# Patient Record
Sex: MALE | Race: WHITE
[De-identification: names, ages, dates, MRNs, and addresses within clinical notes are randomized per-mention and may not be internally consistent; named-entity substitution may affect disease eponyms.]

---

## 2019-08-01 ENCOUNTER — HOSPITAL ENCOUNTER (OUTPATIENT)
Dept: HOSPITAL 61 - PNCL | Age: 84
Discharge: HOME | End: 2019-08-01
Attending: ANESTHESIOLOGY
Payer: MEDICARE

## 2019-08-01 DIAGNOSIS — Z79.82: ICD-10-CM

## 2019-08-01 DIAGNOSIS — M51.16: ICD-10-CM

## 2019-08-01 DIAGNOSIS — Z79.84: ICD-10-CM

## 2019-08-01 DIAGNOSIS — I10: ICD-10-CM

## 2019-08-01 DIAGNOSIS — K21.9: ICD-10-CM

## 2019-08-01 DIAGNOSIS — M96.1: ICD-10-CM

## 2019-08-01 DIAGNOSIS — H91.90: ICD-10-CM

## 2019-08-01 DIAGNOSIS — Z85.79: ICD-10-CM

## 2019-08-01 DIAGNOSIS — M48.061: Primary | ICD-10-CM

## 2019-08-01 DIAGNOSIS — Z79.899: ICD-10-CM

## 2019-08-01 DIAGNOSIS — M19.90: ICD-10-CM

## 2019-08-01 DIAGNOSIS — Z90.89: ICD-10-CM

## 2019-08-01 PROCEDURE — G0463 HOSPITAL OUTPT CLINIC VISIT: HCPCS

## 2019-08-02 NOTE — PAIN
DATE OF SERVICE:  08/01/2019



INITIAL CONSULTATION FOR PAIN CLINIC



DIAGNOSES:  Lumbar radiculopathy, lumbar degenerative disk disease, lumbar

spinal stenosis and post-lumbar laminectomy syndrome.



CHIEF COMPLAINT:  Low back and bilateral lower extremity pain.



HISTORY OF PRESENT ILLNESS:  This is an 83-year-old male with low back pain,

bilateral lower extremity pain going on for many years, worse over the past 6

months or so.  The patient reports it has increased with walking, standing,

changing positions and traveling.  He stopped driving because he is having some

dizziness associated with the pain.  The patient reports it does not awaken him

from sleep at night, though he feels much better with sitting or lying down.  It

does not affect his bowel or bladder control, but does affect his ability to

walk.  He is not using any assistive devices, however, to ambulate.  The patient

did have an MRI scan of the lumbar spine, which is showing previous surgery at

the L3-L4 and L4-L5 levels with disk bulge at L3-L4 and L4-L5, broad-based disk

bulge, with bilateral facet arthropathy at both levels, ligamentum flavum

thickening with marked central spinal stenosis at L3-L4 and marked right and at

least moderate left neural foraminal stenosis at L3-L4 and L4-L5, shows minimal

lateral left recess stenosis and marked right and minimal left neural foraminal

stenosis at L5-S1, shows degenerative disk disease with disk desiccation,

broad-based disk bulge as well as mild left neural foraminal stenosis.  The

patient rates his disability from 0-10, 10 being the worst, as a 2-10 with

family home responsibilities, social activity and occupation, 2 with self-care

and 2 with life support activities.  The patient has not had any formal physical

therapies at this time, no recent other treatments or chiropractic treatment. 

He is just doing some stretching and strengthening on his own, but no formal

therapies or treatments at this time.  The patient did see his neurosurgeon, who

did his first surgery, which was in 2005, and is recommending conservative

measures at this time.  The patient reports significant fatigability to lower

extremities, essentially equal right and left lower extremities with pain

involved as well as low back and hips.  The patient reports pain is intermittent

in intensity, changes during the day, worse with activity, with tingling and

numbness in the low back radiating to both the lower extremities with a shooting

sensation as well.



PAST MEDICAL HISTORY:  Significant for hearing loss; lymphoma, chemotherapy in

2015 to 2018; shortness of breath; pneumonia; hypertension; difficulty

urinating; gastroesophageal reflux; dizziness; arthritis.



PREVIOUS SURGERIES:  Include tonsillectomy, hernia repair, right shoulder

surgery, left total knee arthroplasty, right total knee arthroplasty and lumbar

diskectomy at L3-L4 and L4-L5 in 2005.



CURRENT MEDICATIONS:  Include daily baby aspirin, Plavix, finasteride, B

enzymes, gabapentin, glucosamine, magnesium, ____ and levothyroxine.



ALLERGIES:  The patient has no known drug allergies.



FAMILY HISTORY:  Significant for cancers of various types.



SOCIAL HISTORY:  The patient does not drink alcohol, does not smoke.  He is not

using illegal or recreational drugs.  He is , lives with his spouse,

works as a farmer, lives in [place], [place].



REVIEW OF SYSTEMS:  The patient's review of systems is positive for those items

mentioned in the history of present illness.  All systems reviewed otherwise

negative.  It is complete, full and well documented on the patient's chart.



PHYSICAL EXAMINATION:

VITAL SIGNS:  The patient's blood pressure is 130/82, pulse is 62, respirations

16, temperature 98.3 degrees Fahrenheit, height is 6 feet, weight is 200 pounds.

GENERAL:  The patient is awake, alert, oriented, appropriate, very pleasant

demeanor.

HEENT:  Head shows normocephalic, atraumatic.  Extraocular movements are intact

and symmetrical.  Oral cavity:  Mucous membranes are moist and pink; dentition

is intact.

NECK:  Shows anterior throat supple without palpable lymphadenopathy noted. 

Swallow reflex is symmetrical.

CHEST:  Shows normal on inspection.  Breath sounds are clear to auscultation

bilaterally.

HEART:  Shows S1, S2 clear.  No murmurs auscultated.

ABDOMEN:  Soft, nontender and nondistended.  No palpable organomegaly is noted. 

No rebound or guarding demonstrated.

BACK:  Shows spine grossly in the midline.  Normal-appearing thoracic kyphosis

and some minor flattening of lumbar lordotic curvature with well-healed surgical

scar noted in the lumbar distribution.  Lumbar paraspinous musculature shows

symmetrical on inspection; on palpation shows some moderate tenderness

diffusely, but only diffusely without radiation.  The patient shows good

rotational motion of lumbar spine, both laterally as well as extension and

flexion, without significant increase in pain.  No tenderness over the spinous

process, sacrum or sacroiliac regions.

EXTREMITIES:  The patient's lower extremities show deep tendon reflexes are 1+

in the patellar and tendo-calcaneus tendons.  Motor exam is strong with 5/5

dorsiflexion, extension and equal.  Peripheral pulses are 1+ posterior tibial. 

No peripheral edema is noted.  Straight leg raise is noted to be negative

bilaterally.  Gaenslen's and Lyle maneuvers are negative bilaterally as well.

 Lower extremities are warm and dry to touch, equal in color and appearance. 

The patient is able to stand on his toes without significant difficulty or loss

of balance, walks with a slight favoring gait, does appear to favor the right

lower extremity compared to the left, does not use any assistive device to

ambulate.

SKIN:  Shows warm and dry, good turgor.  No edema.  No sores, rashes or bruises.



IMPRESSION:

1.  This is an 83-year-old male with a long history low back and bilateral lower

extremity pain in a radicular fashion.

2.  MRI scan of lumbar spine as noted.

3.  Hypertension.

4.  History of lymphoma.

5.  Arthritis.



PLAN:  Options were discussed with the patient including conservative medical

management, physical therapy and interventional techniques and he would like to

pursue with interventional techniques.  We discussed a lumbar epidural steroid

injection using descriptions as well as anatomical models to describe the

procedure.  We will first wait for clearance with his prescribing physician for

his Plavix to be held for 7 days prior to injection.  The patient in the

meantime will maintain with stretching and strengthening exercises as tolerated.

 We will have him return if it is deemed safe and appropriate to hold his Plavix

and we will plan on lumbar epidural steroid injection on return.

 



______________________________

MAAME LYNN MD



DR:  [email or web address]  JOB#:  [id number] / [id number]

DD:  08/01/2019 12:53  DT:  08/02/2019 01:09

## 2019-08-15 ENCOUNTER — HOSPITAL ENCOUNTER (OUTPATIENT)
Dept: HOSPITAL 61 - PNCL | Age: 84
End: 2019-08-15
Attending: ANESTHESIOLOGY
Payer: MEDICARE

## 2019-08-15 DIAGNOSIS — M48.061: ICD-10-CM

## 2019-08-15 DIAGNOSIS — M51.16: Primary | ICD-10-CM

## 2019-08-15 DIAGNOSIS — M96.1: ICD-10-CM

## 2019-08-15 PROCEDURE — 62323 NJX INTERLAMINAR LMBR/SAC: CPT

## 2019-08-15 NOTE — PAIN
DATE OF SERVICE:  08/15/2019



DIAGNOSES:  Lumbar radiculopathy with lumbar degenerative disk disease, lumbar

spinal stenosis and post-lumbar laminectomy syndrome.



HISTORY OF PRESENT ILLNESS:  The patient is an 83-year-old male who returns for

followup status post initial evaluation and clearance to hold his Plavix.  He

has received this now from his primary physician and we will proceed today.  The

patient reports he has been off his Plavix now for 7 days, still pain in the low

back, bilateral lower extremities, left greater than right.  He reports it as a

10 on a scale of 10 at its worst in the past week, 7 on average, 2 at its least

and is a 2 today.  The patient reports it is aching, sharp, dull, tight,

tingling, burning, stabbing, on and off in intensity, worse with walking,

standing, changing positions, better with sitting or lying down, does not awaken

him from sleep at night.  The patient reports no new motor or sensory deficits,

no new bowel or bladder incontinence or other complaints.



PHYSICAL EXAMINATION:

VITAL SIGNS:  The patient's blood pressure is 134/81, pulse 67, respirations 16,

temperature 97.8 degrees Fahrenheit, weight is 199 pounds.

GENERAL:  The patient is awake, alert, oriented, appropriate, very pleasant

demeanor.

HEENT:  Head shows normocephalic, atraumatic.  Extraocular movements are intact

and symmetrical.  Oral cavity:  Mucous membranes moist and pink.  Dentition is

intact.

NECK:  Shows anterior throat supple without palpable lymphadenopathy noted. 

Swallow reflex symmetrical.

CHEST:  Shows normal on inspection.  Breath sounds clear to auscultation

bilaterally.

HEART:  Shows S1, S2 clear.  No murmurs auscultated.

ABDOMEN:  Soft, nontender, nondistended.

BACK:  Shows spine grossly in the midline, slight exaggerated thoracic kyphosis

and minor flattening of lumbar lordotic curvature with well-healed surgical scar

is noted.  Lumbar paraspinous muscle shows symmetrical on inspection, on

palpation shows some moderate tenderness diffusely bilaterally going diffusely

without radiation.  The patient shows good rotational motion of lumbar spine,

both laterally as well as extension and flexion without difficulty.

EXTREMITIES:  Lower extremities show deep tendon reflexes 1+ in the patellar and

tendo calcaneus tendons are equal.  Motor exam is 5/5 scale of 5 and equal and

symmetrical and strong bilaterally.  Peripheral pulses are 1+ posterior tibial. 

No peripheral edema is noted.



Options were discussed with the patient.  The patient's old chart was reviewed

as his current medication regimen updated.  Current review of systems updated

today as well.  We will proceed with a lumbar epidural steroid injection today

with fluoroscopic guidance.  Risks were again discussed including, but not

limited to bleeding, infection, possibility of epidural hematoma, subsequent

neurological compromise, dural puncture, headaches, spinal cord and/or nerve

damage, side effects of steroid medication and poor results regarding pain

control.  The patient understands and wished to proceed.  The patient will

return to clinic in approximately 2 weeks for followup.  She was counseled as to

return appointment, activity level and side effects to be aware of.



DIAGNOSES:  Lumbar radiculopathy with lumbar degenerative disk disease and

lumbar post-laminectomy syndrome.



PROCEDURE:  Lumbar epidural steroid injection under sterile prep and drape using

local anesthetic under C-arm fluoroscopic guidance.



MEDICATIONS INJECTED:  A total of 120 mg Depo-Medrol plus 10 mL of

preservative-free normal saline and 2 mL of contrast.



CONDITION AT DISCHARGE:  Stable.  The patient tolerated the procedure well, had

no complications.

 



______________________________

MAAME LYNN MD



DR:  [email or web address]  JOB#:  [id number] / [id number]

DD:  08/15/2019 15:15  DT:  08/15/2019 23:00

## 2019-09-05 ENCOUNTER — HOSPITAL ENCOUNTER (OUTPATIENT)
Dept: HOSPITAL 61 - PNCL | Age: 84
End: 2019-09-05
Attending: ANESTHESIOLOGY
Payer: MEDICARE

## 2019-09-05 DIAGNOSIS — M96.1: ICD-10-CM

## 2019-09-05 DIAGNOSIS — M51.16: Primary | ICD-10-CM

## 2019-09-05 DIAGNOSIS — M48.061: ICD-10-CM

## 2019-09-05 PROCEDURE — 62323 NJX INTERLAMINAR LMBR/SAC: CPT

## 2019-09-06 NOTE — PN
DATE:  09/05/2019



PROGRESS NOTE FOR PAIN CLINIC



DIAGNOSES:  Lumbar radiculopathy with lumbar degenerative disk disease, lumbar

spinal stenosis, and post lumbar laminectomy syndrome.



HISTORY OF PRESENT ILLNESS:  The patient is an 83-year-old male, who returns for

followup status post lumbar epidural steroid injection x1.  The patient reports

about 99% improvement and is currently doing quite a bit better, still has some

numbness and tingling, however, in the bilateral feet with standing.  The

patient reports he has a dull pain in the back, stabbing in the feet, constant

in the feet, but on and off in the rest of the body and the back.  The patient

reports it is a 2 on a scale of 10 at its worst over the past week, 1 on

average, 0 at its least, and is a 1 today.  The patient reports no new motor or

sensory deficits, no new bowel or bladder incontinence.  He has increased his

distance walking, doing work activities, household activities.  The patient

reports he has not slowed down at all, but he is not deliberately trying to

increase his activity above normal levels and he is doing very well.  The

patient reports especially distance walking is much easier and more comfortable,

sleeping well at night.



PHYSICAL EXAMINATION:

VITAL SIGNS:  The patient's blood pressure 118/73, pulse 71, respirations 16,

temperature 97.5 degree Fahrenheit, and weight is 194 pounds.

GENERAL:  The patient is awake, alert, oriented, appropriate, very pleasant

demeanor.

HEENT:  Shows normocephalic, atraumatic.  Extraocular movements are intact and

symmetrical.  Oral cavity shows mucous membranes moist and pink.  Dentition is

intact.

NECK:  Shows anterior throat is supple without palpable lymphadenopathy noted. 

Swallow reflex symmetrical.

CHEST:  Normal on inspection.  Breath sounds are clear to auscultation

bilaterally.

HEART:  Shows S1, S2 clear.  No murmurs auscultated.

ABDOMEN:  Soft, nontender, nondistended.  No palpable organomegaly is noted.  No

rebound or guarding demonstrated.

BACK:  Shows spine grossly in the midline.  Normal appearing thoracic kyphosis

and minor flattening of lumbar lordotic curvature with lumbar flattening and

well-healed surgical scarring noted.  Lumbar rotational motion shows full,

greater than 10 degrees, right and left as well as extension greater than 10

degrees and forward flexion 45 degrees without significant pain reported.

EXTREMITIES:  The patient's lower extremities show deep tendon reflexes at 1+ in

the patellar and tendo-calcaneus tendons.  Motor exam is strong with 5/5. 

Dorsiflexion, extension, quadriceps and hamstring flexion are equal.  Peripheral

pulses are 1+ in posterior tibia.  No peripheral edema is noted.



PLAN:  Options discussed.  The patient's old chart was reviewed as his current

medication list and updated and review of systems updated today as well and we

will proceed with a second in this series of lumbar epidural steroid injection. 

The patient has been off his Plavix now for 8 days.  Risks were again discussed

including but not limited to bleeding, infection, possibility of epidural

hematoma and subsequent neurological compromise, dural puncture, headaches,

spinal cord and/or nerve damage, side effects of steroid medication, and poor

results regarding pain control.  The patient understands and wished to proceed. 

The patient will return to clinic in approximately 2 weeks for followup.  He was

counseled as to return appointment, activity level, and side effects to be aware

of.



DIAGNOSES:  Lumbar radiculopathy with lumbar degenerative disk disease, lumbar

spinal stenosis, and lumbar post laminectomy syndrome.



PROCEDURE:  Lumbar epidural steroid injection, translaminar approach, L5-S1

level, using C-arm fluoroscopic guidance under sterile prep and drape using

local anesthetic.



MEDICATION INJECTED:  A total of 120 mg Depo-Medrol plus 10 mL of

preservative-free normal saline and 2 mL of contrast.



CONDITION AT DISCHARGE:  Stable.  The patient tolerated the procedure well and

had no complications.

 



______________________________

MAAME LYNN MD



DR:  KIMO/henrietta  JOB#:  [id number] / [id number]

DD:  09/05/2019 14:32  DT:  09/06/2019 00:32

## 2019-11-26 ENCOUNTER — HOSPITAL ENCOUNTER (OUTPATIENT)
Dept: HOSPITAL 61 - PNCL | Age: 84
End: 2019-11-26
Attending: ANESTHESIOLOGY
Payer: MEDICARE

## 2019-11-26 DIAGNOSIS — M96.1: ICD-10-CM

## 2019-11-26 DIAGNOSIS — M51.16: Primary | ICD-10-CM

## 2019-11-26 DIAGNOSIS — M48.061: ICD-10-CM

## 2019-11-26 PROCEDURE — 62323 NJX INTERLAMINAR LMBR/SAC: CPT

## 2019-11-26 NOTE — PAIN
DATE OF SERVICE:  11/26/2019



PROGRESS NOTE FOR PAIN CLINIC



DIAGNOSES:  Lumbar radiculopathy with lumbar degenerative disk disease, lumbar

spinal stenosis, lumbar post-laminectomy syndrome.



HISTORY OF PRESENT ILLNESS:  The patient is an 84-year-old male who returns for

followup status post lumbar epidural steroid injections x 2, last seen

09/05/2019.  The patient did well with about 75% improvement overall, still has

some pain in the low back and into the bilateral lower extremities and with some

feet pain and numbness as well.  The patient reports the pain returned after he

had been helping his wife after she had some shoulder surgery.  He was helping

her transport and lift her doing more work around the house as well, which

seemed to increase the pain in the low back and bilateral lower extremities,

more on the right than the left.  The patient reports a 10+ at its worst over

the past week, 7 on average, 1 at its least and is 7 today.  The patient reports

it is tight, radiating to the lower extremities, again more on the right than

the left, in the posterior gluteus, posterior thigh, posterior calf with

tingling, numbness in the feet, that can be unbearable at times, worse with

walking, standing, changing positions, better with sitting or lying down, does

not awaken him from sleep at night.  The patient reports no new motor or sensory

deficits or other complaints.



PHYSICAL EXAMINATION:

VITAL SIGNS:  The patient's blood pressure is 144/83, pulse 55, respirations 18,

temperature 96.8 degrees Fahrenheit, weight is 206 pounds.

GENERAL:  The patient is awake, alert, oriented, appropriate, very pleasant

demeanor.

HEENT:  Shows normocephalic, atraumatic.  Extraocular movements are intact and

symmetrical.  Oral cavity shows mucous membranes moist and pink.  Dentition is

intact.

NECK:  Shows anterior throat supple without palpable lymphadenopathy noted. 

Swallow reflex symmetrical.

CHEST:  Shows normal on inspection.  Breath sounds are clear bilaterally.

HEART:  Shows S1, S2 clear.  No murmurs auscultated.

ABDOMEN:  Soft, nontender, nondistended.  No palpable organomegaly is noted.  No

rebound or guarding demonstrated.

BACK:  Shows spine grossly in the midline.  Normal appearing thoracic kyphosis

and lumbar lordotic curvature, is slightly flattened.  Lumbar paraspinous muscle

shows symmetrical with well-healed surgical scar.  On palpation shows some

moderate tenderness diffusely, but only diffusely without radiation in the low

lumbar distribution, only mildly without atrophy, hypertrophy, no trigger

points.  The patient shows no tenderness over the sacrum or sacroiliac regions. 

The patient shows good rotational motion of lumbar spine, both laterally as well

as extension and flexion without significant pain reported.

EXTREMITIES:  Lower extremities show deep tendon reflexes 1+ in the patellar and

tendo calcaneus tendons.  Motor exam is strong with 5/5 dorsiflexion, extension,

quadriceps and hamstring flexion and symmetrical.  Peripheral pulses are 1+

posterior tibia.  No peripheral edema is noted bilaterally.



Options were discussed with the patient.  The patient's old chart was reviewed

as his current medication regimen updated.  Current review of systems updated

today as well.  We will proceed with a lumbar epidural steroid injection today

with fluoroscopic guidance.  Risks were again discussed including, but not

limited to bleeding, infection, possibility of epidural hematoma, subsequent

neurological compromise, dural puncture, headaches, spinal cord and/or nerve

damage, side effects of steroid medication and poor results regarding pain

control.  The patient understands and wished to proceed.  The patient will

return to clinic in approximately 2 weeks for followup.  He was counseled on

return appointment, activity level and side effects to be aware of.



DIAGNOSES:  Lumbar radiculopathy with lumbar degenerative disk disease, lumbar

spinal stenosis and post-lumbar laminectomy syndrome.



PROCEDURE:  Lumbar epidural steroid injection, translaminar approach at the

L5-S1 level using C-arm fluoroscopic guidance under sterile prep and drape using

local anesthetic.



MEDICATION INJECTED:  A total of 120 mg Depo-Medrol plus 10 mL of

preservative-free normal saline and 2 mL of contrast.



CONDITION AT DISCHARGE:  Stable.  The patient tolerated the procedure well, had

no complications.

 



______________________________

MAAME LYNN MD



DR:  KIMO/henrietta  JOB#:  [id number] / [id number]

DD:  11/26/2019 12:32  DT:  11/26/2019 21:56

## 2020-03-06 ENCOUNTER — HOSPITAL ENCOUNTER (OUTPATIENT)
Dept: HOSPITAL 61 - PNCL | Age: 85
End: 2020-03-06
Attending: ANESTHESIOLOGY
Payer: MEDICARE

## 2020-03-06 DIAGNOSIS — M96.1: ICD-10-CM

## 2020-03-06 DIAGNOSIS — M48.061: ICD-10-CM

## 2020-03-06 DIAGNOSIS — M51.16: Primary | ICD-10-CM

## 2020-03-06 PROCEDURE — 62323 NJX INTERLAMINAR LMBR/SAC: CPT

## 2020-03-06 NOTE — PAIN
DATE OF SERVICE:  03/06/2020



PROGRESS NOTE FOR PAIN CLINIC



DIAGNOSES:  Lumbar radiculopathy with lumbar degenerative disk disease, lumbar

spinal stenosis, and post-lumbar laminectomy syndrome.



HISTORY OF PRESENT ILLNESS:  The patient is an 84-year-old male who returns for

followup status post lumbar epidural steroid injections x 3, last seen on

11/26/2019.  The patient did very well with 100% improvement after the last

injection.  Now, the pain is returning after about 2 months following the

injection very gradually but increasing in low back, bilateral lower

extremities, posterior gluteus, posterior thigh, and posterior calf.  The

patient reports it is a 10 on a scale of 10 at its worst, 7 on average, 2 at its

least, and is a 7 today.  The patient reports no new motor or sensory deficits

and no new bowel or bladder incontinence or other complaints.  He did increase

his activity, but initially was doing much better with distance walking, doing

household activities, working activities, traveling with greater ease, and

sleeping better at night as well.  The patient reports that it still does not

awaken her from sleep at night.  He sleeps about 6-8 hours at a time.  The

patient reports no new motor or sensory deficits and no bowel or bladder

incontinence or other complaints.



PHYSICAL EXAMINATION:

VITAL SIGNS:  The patient's blood pressure is 126/81, pulse 69, respirations 18,

temperature 98.3 degrees Fahrenheit, and weight is 208 pounds.

GENERAL:  The patient is awake, alert, oriented, and appropriate.  Very pleasant

demeanor.

HEENT:  Head shows normocephalic and atraumatic.  Extraocular movements are

intact and symmetrical.  Oral cavity:  Mucous membranes are moist and pink. 

Dentition is intact.

NECK:  Shows anterior throat supple without palpable lymphadenopathy noted. 

Swallow reflex symmetrical.

CHEST:  Shows normal on inspection.  Breath sounds are clear bilaterally.

HEART:  Shows S1, S2 clear.  No murmurs auscultated.

ABDOMEN:  Soft, nontender, and nondistended.  No palpable organomegaly is noted.

 No rebound or guarding demonstrated.

BACK:  Shows spine grossly in the midline.  Normal appearing thoracic kyphosis

and lumbar lordotic curvature.  Lumbar paraspinous muscle shows symmetrical on

inspection and with well-healed surgical scar noted.  On palpation, he has some

moderate tenderness diffusely bilaterally in the upper, middle, and lower

distribution of paraspinous muscles diffusely, but without radiation.  No

trigger points.  No atrophy or hypertrophy.

EXTREMITIES:  The patient's lower extremities show deep tendon reflexes at 1+ in

the patellar and tendo calcaneus tendons are equal.  Motor exam is strong with

5/5 dorsiflexion, extension, quadriceps, and hamstring flexion and symmetrical. 

Peripheral pulses are 1+ in posterior tibia.  No peripheral edema is noted

bilaterally.



Options were discussed with the patient.  The patient's old chart was reviewed. 

His current medication regimen updated.  Current review of systems updated today

as well.  We will proceed with the first in this series of lumbar epidural

steroid injection today with fluoroscopic guidance.  Risks were again discussed

including but not limited to bleeding, infection, possibility of epidural

hematoma, subsequent neurological compromise, dural puncture, headaches, spinal

cord and/or nerve damage, side effects of steroid medication, and poor results

regarding pain control.  The patient understands and wished to proceed.  The

patient will return to clinic in approximately 2 weeks for followup.  She was

counseled on return appointment, activity level, and side effects to be aware

of.



DIAGNOSIS:  Lumbar radiculopathy with lumbar degenerative disk disease, lumbar

spinal stenosis, and lumbar post-laminectomy syndrome.



PROCEDURE:  Lumbar epidural steroid injection, translaminar approach at the

L5-S1 level using C-arm fluoroscopic guidance under sterile prep and drape using

local anesthetic.



MEDICATION INJECTED:  A total of 120 mg Depo-Medrol plus 10 mL of

preservative-free normal saline and 2 mL of contrast.



CONDITION AT DISCHARGE:  Stable.  The patient tolerated procedure well and had

no complications.

 



______________________________

MAAME LYNN MD



DR:  KIMO/henrietta  JOB#:  [id number] / [id number]

DD:  03/06/2020 10:26  DT:  03/06/2020 11:02

## 2020-05-01 ENCOUNTER — HOSPITAL ENCOUNTER (OUTPATIENT)
Dept: HOSPITAL 61 - PNCL | Age: 85
End: 2020-05-01
Attending: ANESTHESIOLOGY
Payer: MEDICARE

## 2020-05-01 DIAGNOSIS — M96.1: ICD-10-CM

## 2020-05-01 DIAGNOSIS — M51.16: Primary | ICD-10-CM

## 2020-05-01 DIAGNOSIS — M48.061: ICD-10-CM

## 2020-05-01 PROCEDURE — 62323 NJX INTERLAMINAR LMBR/SAC: CPT

## 2020-05-01 NOTE — PAIN
DATE OF SERVICE:  05/01/2020



PROGRESS NOTE FOR PAIN CLINIC



DIAGNOSES:  Lumbar radiculopathy with lumbar degenerative disk disease, lumbar

spinal stenosis, lumbar post-laminectomy syndrome.



HISTORY OF PRESENT ILLNESS:  The patient is an 84-year-old male who returns for

followup status post lumbar epidural steroid injections, most recently on

11/26/2019, the patient did very well with near 100% improvement after the

injection.  The pain has been returning now over the past month or so in the low

back and bilateral lower extremities, mostly in the posterior gluteus, posterior

thighs, somewhat more on the right than the left, but present bilaterally.  The

patient reports it is a 9 on a scale of 10 at its worst over the past week, 7 on

average, 3 at its least and is a 3 today.  The patient reports it is sharp and

dull in the back alternating tingling and burning in the legs, shooting,

constant, becoming more severe with activity, walking, standing, better with

sitting or lying down, does not awaken him from sleep generally.  The patient

reports it is more noticeable with walking and changing positions.  The patient

reports no new motor or sensory deficits, no new bowel or bladder incontinence

or other complaints.



PHYSICAL EXAMINATION:

VITAL SIGNS:  The patient's blood pressure 134/81, pulse 71, respirations 18,

temperature 97.5 degrees Fahrenheit, height is 5 feet 11 inches and weight is

207 pounds.

GENERAL:  The patient is awake, alert, oriented, appropriate, very pleasant

demeanor.

HEENT:  Head shows normocephalic, atraumatic.  Extraocular movements are intact

and symmetrical.  Oral cavity:  Mucous membranes are moist and pink.  Dentition

is intact.

NECK:  Shows anterior throat supple without palpable lymphadenopathy noted. 

Swallow reflex symmetrical.

CHEST:  Shows normal on inspection.  Breath sounds are clear to auscultation

bilaterally.

HEART:  Shows S1, S2 clear.  No murmurs auscultated.

ABDOMEN:  Soft, nontender, nondistended.  No palpable organomegaly is noted. 

There is no rebound or guarding demonstrated.

BACK:  Shows spine grossly in the midline.  Normal appearing thoracic kyphosis

and some slight flattening of lumbar lordotic curvature with well-healed

surgical scar noted.  Lumbar paraspinous muscle shows symmetrical on inspection,

on palpation shows some moderate tenderness bilaterally diffusely in the middle

and lower distribution of paraspinous muscles, but without significant

radiation, without trigger points.  The patient has good rotational motion of

lumbar spine, both laterally as well as extension and flexion without

significant pain.

EXTREMITIES:  The patient's lower extremities show deep tendon reflexes at 1+ in

the patellar and tendo calcaneus tendons.  Motor exam is strong with 5/5

dorsiflexion, extension, quadriceps and hamstring flexion and symmetrical. 

Peripheral pulses are 1+ posterior tibial.  No peripheral edema is noted

bilaterally.



Options were discussed with the patient.  The patient's old chart was reviewed

as his current medication regimen updated.  Current review of systems updated

today as well.  We will proceed with a lumbar epidural steroid injection first

in a series today with fluoroscopic guidance.  Risks were discussed including

but not limited to bleeding, infection, possibility of epidural hematoma,

subsequent neurological compromise, dural puncture, headaches, spinal cord

and/or nerve damage, side effects of steroid medication and poor results

regarding pain control.  The patient understands and wished to proceed.  The

patient will return to clinic in approximately 2 weeks for followup.  He was

counseled on return appointment, activity level and side effects to be aware of.



DIAGNOSES:  Lumbar radiculopathy with lumbar degenerative disk disease and

lumbar spinal stenosis, lumbar post-laminectomy syndrome.



PROCEDURE:  Lumbar epidural steroid injection, translaminar approach at L5-S1

level using C-arm fluoroscopic guidance under sterile prep and drape using local

anesthetic.



MEDICATION INJECTED:  A total of 120 mg of Depo-Medrol plus 10 mL of

preservative-free normal saline and 2 mL of contrast.



CONDITION AT DISCHARGE:  Stable.  The patient tolerated the procedure well, had

no complications.

 



______________________________

MAAME LYNN MD



DR:  [email or web address]  JOB#:  [id number] / [id number]

DD:  05/01/2020 10:21  DT:  05/01/2020 10:51

## 2020-05-15 ENCOUNTER — HOSPITAL ENCOUNTER (OUTPATIENT)
Dept: HOSPITAL 61 - PNCL | Age: 85
Discharge: HOME | End: 2020-05-15
Attending: ANESTHESIOLOGY
Payer: MEDICARE

## 2020-05-15 DIAGNOSIS — M51.16: Primary | ICD-10-CM

## 2020-05-15 DIAGNOSIS — M96.1: ICD-10-CM

## 2020-05-15 PROCEDURE — G0463 HOSPITAL OUTPT CLINIC VISIT: HCPCS

## 2020-05-15 NOTE — PAIN
DATE OF SERVICE:  05/15/2020



PROGRESS NOTE FOR PAIN CLINIC



DIAGNOSES:  Lumbar radiculopathy with lumbar degenerative disk disease, lumbar

spinal stenosis and post-lumbar laminectomy syndrome.



HISTORY OF PRESENT ILLNESS:  The patient is an 84-year-old male who returns for

followup status post lumbar epidural steroid injection x 2, last seen

05/01/2020.  Patient did very well with decreased pain by approximately 50%

overall.  The patient reports still some pain in the low back and right greater

than left lower extremity, but better and still consistently staying, but over

the last few weeks the patient reports it does not awaken him from sleep at

night.  He has increased his activity with greater ease and comfort, walking

greater distances, still has some pain across the low back into the right

posterior hip, posterior lateral thigh as well as on the left, but present

bilaterally.  The patient reports it is much more tolerable, although the

patient reports it is a 9 on a scale of 10 at its worst over the past week, 7 on

average, 3 at its least and is a 3 today.  The patient reports it is sharp and

dull aching, stabbing and burning in the low back, sometimes it is shooting and

radiating into the lower extremities, again somewhat more on the right than the

left.  The patient reports no new motor or sensory deficits, no new bowel or

bladder incontinence or other complaints.



PHYSICAL EXAMINATION:

VITAL SIGNS:  The patient's blood pressure is 131/73, pulse 73, respirations 18,

temperature 97.9 degrees Fahrenheit, weight is 209 pounds.

GENERAL:  The patient is awake, alert, oriented, appropriate, very pleasant

demeanor.

HEENT:  Shows normocephalic, atraumatic.  Extraocular movements are intact and

symmetrical.  Oral cavity shows mucous membranes moist and pink.  Dentition is

intact.

NECK:  Shows anterior throat supple without palpable lymphadenopathy noted. 

Swallow reflex symmetrical.

CHEST:  Shows normal on inspection.  Breath sounds are clear bilaterally.

HEART:  Shows S1, S2 clear.  No murmurs auscultated.

ABDOMEN:  Soft, nontender, nondistended.  No palpable organomegaly is noted.  No

rebound or guarding demonstrated.

BACK:  Shows spine grossly in the midline.  Slight increase in thoracic

kyphosis, some minor flattening of lumbar lordotic curvature.  Lumbar

paraspinous muscle shows symmetrical on inspection, on palpation shows some

moderate tenderness diffusely bilaterally, but only diffusely without

significant radiation.  The patient has good rotational motion of lumbar spine,

both laterally as well as extension and flexion without significant difficulty.

EXTREMITIES:  The patient's lower extremities show deep tendon reflexes at 1+

patellar and tendo calcaneus tendons.  Motor exam is strong with 5/5

dorsiflexion, extension, quadriceps and hamstring flexion symmetrical. 

Peripheral pulses are 1+ posterior tibia.  No peripheral edema is noted

bilaterally.



Options were discussed with the patient.  The patient's old chart was reviewed

as his current medication regimen updated.  Current review of systems updated

today as well.  We will hold on any further injections.  As the patient is doing

quite a bit better, I would like to wait.  He has had 2 injections.  He has only

allowed by his insurance provider 3 in a 6-month period.  I would like to wait

for any further injections.  The patient was encouraged to increase activity as

tolerated, also do stretching and strengthening exercises, which we discussed

with him in detail today.  The patient will follow up at this point on an as

needed basis.

 



______________________________

MAAME LYNN MD



DR:  [email or web address]  JOB#:  [id number] / [id number]

DD:  05/15/2020 10:52  DT:  05/15/2020 11:31

## 2020-08-03 ENCOUNTER — HOSPITAL ENCOUNTER (OUTPATIENT)
Dept: HOSPITAL 61 - PNCL | Age: 85
Discharge: HOME | End: 2020-08-03
Attending: ANESTHESIOLOGY
Payer: MEDICARE

## 2020-08-03 DIAGNOSIS — M48.061: ICD-10-CM

## 2020-08-03 DIAGNOSIS — Z79.899: ICD-10-CM

## 2020-08-03 DIAGNOSIS — I10: ICD-10-CM

## 2020-08-03 DIAGNOSIS — Z88.8: ICD-10-CM

## 2020-08-03 DIAGNOSIS — M51.16: Primary | ICD-10-CM

## 2020-08-03 DIAGNOSIS — Z79.82: ICD-10-CM

## 2020-08-03 PROCEDURE — 62323 NJX INTERLAMINAR LMBR/SAC: CPT

## 2020-08-03 NOTE — PAIN
DATE OF SERVICE:  08/03/2020



PROGRESS NOTE FOR PAIN CLINIC



DIAGNOSES:  Lumbar radiculopathy with lumbar degenerative disk disease, lumbar

spinal stenosis, and post-lumbar laminectomy syndrome.



HISTORY OF PRESENT ILLNESS:  The patient is an 84-year-old male who returns for

followup status post lumbar epidural steroid injections x 2, most recently

05/01/2020.  The patient did very well with about 75% improvement after that

injection with now about only 50% improvement left with pain in the low back and

mainly in the right greater than left lower extremity, posterior gluteus,

posterior thigh, lateral thigh, anterior thigh, posterior calf, and into the

foot.  The patient reports that 10 on a scale of 10 at its worst in the past

week, 8 on average, 1 at its least and is an 8 today.  He reports it as dull and

stabbing, can be severe, unbearable with activity, walking, standing, change in

positions, initially was doing quite well with all those activities, but now the

pain has returned.  This generally does not awaken him from sleep at night.  The

patient reports he sleeps in a recliner.  This is more comfortable for his back.

 The patient reports no new motor or sensory deficits, no new bowel or bladder

incontinence or other complaints.



PHYSICAL EXAMINATION:

VITAL SIGNS:  The patient's blood pressure is 157/90, pulse 73, respirations 16,

temperature is 97.6 degrees Fahrenheit, height is 6 feet, and weight is 214

pounds.

GENERAL:  The patient is awake, alert, oriented, appropriate, very pleasant

demeanor.

HEENT:  Shows normocephalic, atraumatic.  Extraocular movements are intact and

symmetrical.  Oral cavity:  Mucous membranes moist and pink.  Dentition is

intact.

NECK:  Shows anterior throat supple without palpable lymphadenopathy noted. 

Swallow reflex symmetrical.

CHEST:  Shows normal on inspection.  Breath sounds clear to auscultation

bilaterally.  No rales, rhonchi, or wheezes auscultated.

HEART:  Shows S1, S2 clear.  No murmurs auscultated.

ABDOMEN:  Soft, nontender, nondistended.  No palpable organomegaly is noted.  No

rebound or guarding demonstrated.

BACK:  The patient's back shows spine grossly in midline with well-healed

surgical scar noted with some flattening of lumbar lordotic curvature.  Lumbar

paraspinous muscle shows symmetrical on inspection, on palpation shows some

moderate tenderness diffusely.

EXTREMITIES:  Lower extremities show deep tendon reflexes 1+ in the patellar and

tendo calcaneus tendons.  Motor exam is 5/5 with dorsiflexion, extension,

quadriceps, and hamstring flexion symmetrical.  Peripheral pulses are 1+

posterior tibia.  No peripheral edema is noted bilaterally.



Options were discussed with the patient.  The patient's old chart was reviewed

as his current medication regimen updated.  Current review of systems updated

today as well.  We will proceed with a third in a series of lumbar epidural

steroid injection today with fluoroscopic guidance.  Risks were again discussed

including, but not limited to bleeding, infection, possibility of epidural

hematoma, subsequent neurological compromise, dural puncture, headaches, spinal

cord and/or nerve damage, side effects of steroid medication and poor results

regarding pain control.  The patient understands and wished to proceed.  The

patient will return to clinic in approximately 2 weeks for followup.  He was

counseled as to return appointment, activity level, and side effects to be aware

of.



DIAGNOSES:  Lumbar radiculopathy with lumbar degenerative disk disease, lumbar

spinal stenosis, and post-lumbar laminectomy syndrome.



PROCEDURE:  Lumbar epidural steroid injection, translaminar approach, L5-S1

level, using C-arm fluoroscopic guidance under sterile prep and drape using

local anesthetic.



MEDICATIONS INJECTED:  A total of 120 mg Depo-Medrol plus 10 mL of

preservative-free normal saline and 2 mL of contrast.



CONDITION AT DISCHARGE:  Stable.  The patient tolerated the procedure well, had

no complications.

 



______________________________

MAAME LYNN MD



DR:  KIMO/[email or web address]  JOB#:  [id number] / [id number]

DD:  08/03/2020 10:36  DT:  08/03/2020 10:52

## 2020-09-30 ENCOUNTER — HOSPITAL ENCOUNTER (OUTPATIENT)
Dept: HOSPITAL 61 - PNCL | Age: 85
Discharge: HOME | End: 2020-09-30
Attending: ANESTHESIOLOGY
Payer: MEDICARE

## 2020-09-30 DIAGNOSIS — K21.9: ICD-10-CM

## 2020-09-30 DIAGNOSIS — M51.16: Primary | ICD-10-CM

## 2020-09-30 DIAGNOSIS — Z79.899: ICD-10-CM

## 2020-09-30 DIAGNOSIS — M48.061: ICD-10-CM

## 2020-09-30 DIAGNOSIS — Z88.8: ICD-10-CM

## 2020-09-30 DIAGNOSIS — I10: ICD-10-CM

## 2020-09-30 PROCEDURE — 62323 NJX INTERLAMINAR LMBR/SAC: CPT

## 2020-09-30 NOTE — PDOC
Progress Note - Pain Clinic


Date of Service:


DOS:


DATE: 9/30/20 


TIME: 09:55





Diagnosis:


Dx:


Lumbar radiculopathy with lumbar degenerative disc disease lumbar spinal 

stenosis and post lumbar laminectomy syndrome





History or Present Illness:


HPI:


84-year-old male returns follow-up status post lumbar epidurals injection x3 

most recently seen August 3, 2020 patient reports did very well at 90% improvem

ent for about a month and a half.  Patient reports the pain is returning down 

the low back and the bilateral lower extremities posterior gluteus posterior 

thighs posterior calf into the feet as well patient reports initially was doing 

much better with distance walking doing work activities household activities 

traveling greater ease and comfort still sleeps well at night does not awaken 

him from sleep generally better with sitting or laying down.  Patient reports 

his pain is a 10 on scale 10 is worse over the past week 8 on average 3 at its 

least is 8 today.  Patient reports is aching sharp stabbing radiating can be 

severe and unbearable in the low back with activity.  Patient reports no new 

motor or sensory deficits no new bowel or bladder incontinence or other 

complaints.





Physical Exam:


VS:


Blood pressure is 147/84 pulse 66 respirations 20 temperature 97.9 F height is 

6 foot weight is 223 pounds


PE:


PHYSICAL EXAMINATION:





GENERAL: The patient is awake, alert, oriented, appropriate, very pleasant 

demeanor


HEENT: Shows normocephalic, atraumatic.  Extraocular movements are intact and 

symmetrical.  Oral cavity: Mucous membranes moist and pink.  Dentition is 

intact.


NECK: Shows anterior throat supple without palpable lymphadenopathy noted.  

Swallow reflex symmetrical.


CHEST: Shows normal on inspection.  Breath sounds are clear bilaterally, no 

rales rhonchi or wheezes auscultated.


HEART: Shows S1, S2 clear.  No murmurs auscultated.


ABDOMEN: Soft, nontender, nondistended, obese.  No palpable organomegaly is 

noted.  No rebound or guarding demonstrated.


BACK: Shows spine grossly in the midline.  Normal-appearing cervical lordotic 

curvature.  There is slightly increased thoracic kyphosis, some minor flattening

of the lumbar lordotic curvature.  Lumbar paraspinous muscles show symmetrical 

on inspection, on palpation shows some moderate tenderness diffusely throughout 

the upper, middle and lower distribution of the paraspinous muscles bilaterally,

but without specific trigger points, without radiation of pain.  The patient has

good rotational motion of the lumbar spine, both laterally as well as extension 

and flexion without significant difficulty.  No tenderness over the spinous 

processes, sacrum or sacroiliac regions.


EXTREMITIES: Lower extremities show deep tendon reflexes 1+ in the patellar and 

tendo calcaneus tendons.  Motor exam is 5 on a scale of 5 with right 

dorsiflexion, extension, quadriceps and hamstring flexion and 5/5 on the left.  

Peripheral pulses are 1+ posterior tibial.  1-2+ peripheral edema is noted on 

the left lower extremity from the ankle to approximately half the distance to 

the knee on the anterior tibia, right side shows no edema.  Lower extremities 

are warm and dry to touch, equal in color and appearance.  


SKIN: Shows warm and dry, good turgor.  No edema.  No sores, rashes or bruising 

throughout.





Procedure:


Procedure:


Options were discussed with the patient.  Patient will chart was reviewed his 

his current medication regimen updated current review of systems updated today 

as well.  We will proceed with a first in the series lumbar epidural steroid 

injection today with fluoroscopic guidance.  Risks were discussed including but 

not limited to: Bleeding, infection, possibility of epidural hematoma and 

subsequent neurological compromise, dural puncture, headaches, spinal cord 

and/or nerve damage, side effects of steroid medication, and poor results 

regarding pain control.  Patient understands wished to proceed.  Patient return 

to clinic in possibly 2 weeks for follow-up was counseled as to return 

appointment activity level and side effects to be aware of.





Medication Injected:


Med Injected:


Procedure is lumbar epidural steroid injection under local anesthetic using 

sterile prep and drape at the L5-S1 level using C-arm fluoroscopic guidance in 

both AP and lateral views medications injected is 120 mg Depo-Medrol + 10 mL 

preservative-free normal saline and 2 mL contrast- condition at discharge is 

stable patient tolerated procedure well had no complications.





Condition at Discharge:


Condition at Discharge:


Condition at discharge stable patient tolerated the procedure well had no 

complications.











MAAME LYNN MD               Sep 30, 2020 09:58

## 2021-01-12 ENCOUNTER — HOSPITAL ENCOUNTER (OUTPATIENT)
Dept: HOSPITAL 61 - PNCL | Age: 86
Discharge: HOME | End: 2021-01-12
Attending: ANESTHESIOLOGY
Payer: MEDICARE

## 2021-01-12 DIAGNOSIS — M96.1: ICD-10-CM

## 2021-01-12 DIAGNOSIS — M48.061: ICD-10-CM

## 2021-01-12 DIAGNOSIS — M51.16: Primary | ICD-10-CM

## 2021-01-12 DIAGNOSIS — Z79.899: ICD-10-CM

## 2021-01-12 DIAGNOSIS — Z79.82: ICD-10-CM

## 2021-01-12 DIAGNOSIS — Z88.8: ICD-10-CM

## 2021-01-12 DIAGNOSIS — Z88.1: ICD-10-CM

## 2021-01-12 PROCEDURE — G0463 HOSPITAL OUTPT CLINIC VISIT: HCPCS

## 2021-01-12 NOTE — PDOC
Progress Note - Pain Clinic


Date of Service:


DOS:


DATE: 1/12/21 


TIME: 11:24





Diagnosis:


Dx:


Lumbar radiculopathy with lumbar degenerative disc disease lumbar spinal 

stenosis and post lumbar laminectomy syndrome





History or Present Illness:


HPI:


85-year-old male returns to follow-up status post lumbar epidural steroid action

x1, last seen September 30, 2020.  Patient reports he did very well after last 

injection year 100% improvement for about 3 months or so the pain is returning 

now in the low back and in the bilateral lower extremities in the posterior 

gluteus posterior thigh posterior calves as well as in the feet.  Patient 

reports is a 10 on scale 10 is worse over the past week 5 on average 1 its least

and is a 5 today.  Patient describes pain as aching tingling burning unbearable 

at times with walking standing better with sitting or laying down generally does

not awaken him from sleep at night.  Patient reports no new motor or sensory 

deficits noted or other complaints.  Patient reports that he is still taking his

Plavix and took a dose this morning.





Physical Exam:


VS:


Blood pressure is 140/71 pulse 61 respirations 20 temperature is 97.9 F height 

is 6 foot weight is 221 pounds


PE:


PHYSICAL EXAMINATION:





GENERAL: The patient is awake, alert, oriented, appropriate, very pleasant 

demeanor


HEENT: Shows normocephalic, atraumatic.  Extraocular movements are intact and 

symmetrical. 


NECK: Shows anterior throat supple without palpable lymphadenopathy noted.  

Swallow reflex symmetrical.


CHEST: Shows normal on inspection.  Breath sounds are clear bilaterally, no 

rales or rhonchi, obese.


HEART: Shows S1, S2 clear.  No murmurs auscultated.


ABDOMEN: Soft, nontender, nondistended.  No palpable organomegaly is noted.  


BACK: Shows spine grossly in the midline.  Normal-appearing cervical lordotic 

curvature.  There is slightly increased thoracic kyphosis, some flattening of 

the lumbar lordotic curvature.  Well-healed surgical scar again noted.  Lumbar 

paraspinous muscles show symmetrical on inspection, on palpation shows some 

moderate tenderness diffusely throughout the upper, middle and lower 

distribution of the paraspinous muscles without specific trigger points, without

radiation of pain.  The patient has good rotational motion of the lumbar spine, 

both laterally as well as extension and flexion without significant difficulty. 

No tenderness over the spinous processes, sacrum or sacroiliac regions.


EXTREMITIES: Lower extremities show deep tendon reflexes 1+ in the patellar and 

tendo calcaneus tendons.  Motor exam is 5 on a scale of 5 with right 

dorsiflexion, extension, quadriceps and hamstring flexion and 5/5 on the left.  

Peripheral pulses are 1+ posterior tibial.  No peripheral edema is noted 

bilaterally.  Lower extremities are warm and dry to touch, equal in color and 

appearance. 


SKIN: Shows warm and dry, good turgor.  No edema.  No sores, rashes or bruising 

throughout.





Procedure:


Procedure:


Options were discussed with the patient.  Patient chart reviewed his current 

medication regimen updated current review of systems updated today as well.  We 

will check with patient's cardiologist and cleared holding his Plavix for 1 week

prior to potential lumbar epidural steroid injection.  Patient did very well w

ith these in the past would like to proceed and is somewhat upset that he did 

not stop the Plavix on his own however we discussed with him the importance of 

taking it until we cleared this with his cardiologist and if deemed safe and 

appropriate will then contact him and have him hold it prior to return.  Patient

voices understanding.





Medication Injected:


Med Injected:


None





Condition at Discharge:


Condition at Discharge:


Condition at discharge is stable.











MAAME LYNN MD               Jan 12, 2021 11:27